# Patient Record
Sex: FEMALE | Race: WHITE | NOT HISPANIC OR LATINO
[De-identification: names, ages, dates, MRNs, and addresses within clinical notes are randomized per-mention and may not be internally consistent; named-entity substitution may affect disease eponyms.]

---

## 2017-10-26 ENCOUNTER — APPOINTMENT (OUTPATIENT)
Dept: INTERNAL MEDICINE | Facility: CLINIC | Age: 32
End: 2017-10-26
Payer: COMMERCIAL

## 2017-10-26 VITALS
RESPIRATION RATE: 17 BRPM | DIASTOLIC BLOOD PRESSURE: 71 MMHG | BODY MASS INDEX: 23.22 KG/M2 | TEMPERATURE: 98.7 F | HEART RATE: 60 BPM | SYSTOLIC BLOOD PRESSURE: 104 MMHG | WEIGHT: 129 LBS | OXYGEN SATURATION: 99 %

## 2017-10-26 DIAGNOSIS — Z11.3 ENCOUNTER FOR SCREENING FOR INFECTIONS WITH A PREDOMINANTLY SEXUAL MODE OF TRANSMISSION: ICD-10-CM

## 2017-10-26 DIAGNOSIS — Z97.3 PRESENCE OF SPECTACLES AND CONTACT LENSES: ICD-10-CM

## 2017-10-26 DIAGNOSIS — Z23 ENCOUNTER FOR IMMUNIZATION: ICD-10-CM

## 2017-10-26 DIAGNOSIS — Z12.4 ENCOUNTER FOR SCREENING FOR MALIGNANT NEOPLASM OF CERVIX: ICD-10-CM

## 2017-10-31 LAB — CYTOLOGY CVX/VAG DOC THIN PREP: NORMAL

## 2017-12-27 ENCOUNTER — APPOINTMENT (OUTPATIENT)
Dept: INTERNAL MEDICINE | Facility: CLINIC | Age: 32
End: 2017-12-27

## 2019-10-21 ENCOUNTER — APPOINTMENT (OUTPATIENT)
Dept: OBGYN | Facility: CLINIC | Age: 34
End: 2019-10-21
Payer: COMMERCIAL

## 2019-10-21 VITALS
BODY MASS INDEX: 22.43 KG/M2 | TEMPERATURE: 98.2 F | HEIGHT: 62.5 IN | SYSTOLIC BLOOD PRESSURE: 112 MMHG | WEIGHT: 125 LBS | DIASTOLIC BLOOD PRESSURE: 84 MMHG | HEART RATE: 71 BPM | OXYGEN SATURATION: 98 %

## 2019-10-21 DIAGNOSIS — Z01.419 ENCOUNTER FOR GYNECOLOGICAL EXAMINATION (GENERAL) (ROUTINE) W/OUT ABNORMAL FINDINGS: ICD-10-CM

## 2019-10-21 PROCEDURE — 99385 PREV VISIT NEW AGE 18-39: CPT

## 2019-10-22 NOTE — PHYSICAL EXAM
[Awake] : awake [Alert] : alert [Acute Distress] : no acute distress [Thyroid Nodule] : no thyroid nodule [LAD] : no lymphadenopathy [Goiter] : no goiter [Mass] : no breast mass [Nipple Discharge] : no nipple discharge [Soft] : soft [Axillary LAD] : no axillary lymphadenopathy [Tender] : non tender [Oriented x3] : oriented to person, place, and time [Normal] : uterus [No Bleeding] : there was no active vaginal bleeding [Uterine Adnexae] : were not tender and not enlarged

## 2020-04-26 ENCOUNTER — MESSAGE (OUTPATIENT)
Age: 35
End: 2020-04-26

## 2020-06-30 ENCOUNTER — TRANSCRIPTION ENCOUNTER (OUTPATIENT)
Age: 35
End: 2020-06-30

## 2020-12-21 PROBLEM — Z01.419 ENCOUNTER FOR ANNUAL ROUTINE GYNECOLOGICAL EXAMINATION: Status: RESOLVED | Noted: 2019-10-22 | Resolved: 2020-12-21

## 2021-07-01 ENCOUNTER — FORM ENCOUNTER (OUTPATIENT)
Age: 36
End: 2021-07-01

## 2021-07-01 ENCOUNTER — RESULT REVIEW (OUTPATIENT)
Age: 36
End: 2021-07-01

## 2021-07-02 ENCOUNTER — FORM ENCOUNTER (OUTPATIENT)
Age: 36
End: 2021-07-02

## 2021-07-02 ENCOUNTER — APPOINTMENT (OUTPATIENT)
Dept: OBGYN | Facility: CLINIC | Age: 36
End: 2021-07-02
Payer: COMMERCIAL

## 2021-07-02 PROCEDURE — 99395 PREV VISIT EST AGE 18-39: CPT

## 2021-07-02 PROCEDURE — 99072 ADDL SUPL MATRL&STAF TM PHE: CPT

## 2021-07-07 ENCOUNTER — FORM ENCOUNTER (OUTPATIENT)
Age: 36
End: 2021-07-07

## 2021-09-16 ENCOUNTER — APPOINTMENT (OUTPATIENT)
Dept: OBGYN | Facility: CLINIC | Age: 36
End: 2021-09-16

## 2021-09-20 ENCOUNTER — FORM ENCOUNTER (OUTPATIENT)
Age: 36
End: 2021-09-20

## 2021-09-21 ENCOUNTER — APPOINTMENT (OUTPATIENT)
Dept: OBGYN | Facility: CLINIC | Age: 36
End: 2021-09-21
Payer: COMMERCIAL

## 2021-09-21 PROCEDURE — 81025 URINE PREGNANCY TEST: CPT

## 2021-09-21 PROCEDURE — 58300 INSERT INTRAUTERINE DEVICE: CPT

## 2021-09-29 DIAGNOSIS — Z97.5 PRESENCE OF (INTRAUTERINE) CONTRACEPTIVE DEVICE: ICD-10-CM

## 2021-09-29 LAB — CYTOLOGY CVX/VAG DOC THIN PREP: NORMAL

## 2021-09-29 RX ORDER — MULTIVITAMIN
TABLET ORAL
Refills: 0 | Status: ACTIVE | COMMUNITY

## 2021-11-11 ENCOUNTER — APPOINTMENT (OUTPATIENT)
Dept: OBGYN | Facility: CLINIC | Age: 36
End: 2021-11-11
Payer: COMMERCIAL

## 2021-11-11 VITALS
WEIGHT: 125 LBS | BODY MASS INDEX: 23 KG/M2 | SYSTOLIC BLOOD PRESSURE: 103 MMHG | DIASTOLIC BLOOD PRESSURE: 70 MMHG | HEIGHT: 62 IN

## 2021-11-11 DIAGNOSIS — Z30.431 ENCOUNTER FOR ROUTINE CHECKING OF INTRAUTERINE CONTRACEPTIVE DEVICE: ICD-10-CM

## 2021-11-11 PROCEDURE — 99213 OFFICE O/P EST LOW 20 MIN: CPT

## 2021-11-11 NOTE — HISTORY OF PRESENT ILLNESS
[FreeTextEntry1] : 37 yo F here for IUD check. Pt is doing well, no complaints.\par Mirena IUD placed 9/21/21

## 2021-11-11 NOTE — DISCUSSION/SUMMARY
[FreeTextEntry1] : IUD check today, strings present. \par Reviewed Mirena\par all questions answered

## 2022-05-21 ENCOUNTER — EMERGENCY (EMERGENCY)
Facility: HOSPITAL | Age: 37
LOS: 1 days | Discharge: HOME | End: 2022-05-21
Attending: EMERGENCY MEDICINE
Payer: COMMERCIAL

## 2022-05-21 VITALS
SYSTOLIC BLOOD PRESSURE: 129 MMHG | OXYGEN SATURATION: 100 % | WEIGHT: 128.97 LBS | DIASTOLIC BLOOD PRESSURE: 95 MMHG | TEMPERATURE: 98 F | RESPIRATION RATE: 20 BRPM | HEART RATE: 86 BPM | HEIGHT: 62 IN

## 2022-05-21 VITALS
OXYGEN SATURATION: 100 % | TEMPERATURE: 98 F | SYSTOLIC BLOOD PRESSURE: 118 MMHG | HEART RATE: 68 BPM | DIASTOLIC BLOOD PRESSURE: 81 MMHG | RESPIRATION RATE: 18 BRPM

## 2022-05-21 LAB — SARS-COV-2 RNA SPEC QL NAA+PROBE: DETECTED

## 2022-05-21 PROCEDURE — U0003: CPT

## 2022-05-21 PROCEDURE — 99285 EMERGENCY DEPT VISIT HI MDM: CPT

## 2022-05-21 PROCEDURE — U0005: CPT

## 2022-05-21 PROCEDURE — 99284 EMERGENCY DEPT VISIT MOD MDM: CPT

## 2022-05-21 RX ORDER — ONDANSETRON 8 MG/1
4 TABLET, FILM COATED ORAL ONCE
Refills: 0 | Status: COMPLETED | OUTPATIENT
Start: 2022-05-21 | End: 2022-05-21

## 2022-05-21 RX ADMIN — ONDANSETRON 4 MILLIGRAM(S): 8 TABLET, FILM COATED ORAL at 12:24

## 2022-05-21 NOTE — ED PROVIDER NOTE - PHYSICAL EXAMINATION
Gen: NAD, non-toxic appearing  Head: normal appearing  HEENT: normal conjunctiva, oral mucosa moist  Lung: no respiratory distress, speaking in full sentences, CTA b/l     CV: regular rate and rhythm, no murmurs  Abd: soft, non distended, non tender   MSK: no visible deformities  Neuro: No focal deficits, AAOx3  Skin: Warm  Psych: normal affect Gen: NAD, non-toxic appearing  Head: normal appearing  HEENT: normal conjunctiva, oral mucosa moist  Lung: no respiratory distress, speaking in full sentences, CTA b/l     CV: regular rate and rhythm, no murmurs  Abd: soft, non distended, non tender   MSK: no visible deformities  Neuro: No focal deficits, AAOx3  Skin: Warm  Psych: normal affect  **ATTENDING ADDENDUM (Dr. Alcides Perry): I have reviewed and substantially contributed to the elements of the PE as documented above. I have directly performed an examination of this patient in conjunction with the other members (EM resident/PA/NP) of the patient care team. I have personally reviewed the patient's vital signs at the time of the patient's initial presentation to the ED and repeatedly throughout the ED course.

## 2022-05-21 NOTE — ED PROVIDER NOTE - PLAN OF CARE
**ATTENDING ADDENDUM (Dr. Alcides Perry): Goals of care include resolution of emergent/urgent symptoms and concerns, and restoration to baseline level of homeostasis.

## 2022-05-21 NOTE — ED PROVIDER NOTE - NSFOLLOWUPINSTRUCTIONS_ED_ALL_ED_FT
You are being testing for the COVID-19 virus. The tests performed for this and other viruses may not come back immediately. Here are some things you should know about the testing and treatment process in the meantime:    If you test positive for a different virus (for example, the flu) and the COVID test is negative, then a prolonged quarantine period is not necessary. You should stay home and avoid contact with others until you are without fever for at least 24 hours and your other symptoms are starting to improve.    If the tests for the other viruses are negative, then you will need to follow the quarantine procedures we discussed regardless of whether or not you test positive for COVID-19.    It is important for you to know that one negative test for COVID-19 is not enough to stop the quarantine procedures. If all the tests performed today are negative, you will still need to be quarantined at home.    We are recommending a 7 day quarantine. At the end of that time, you must still meet all of the following criteria:  - You have been without fever for at least 24 hours, without the use of medication to suppress fever  - Your other symptoms are improving  - If you tested positive for COVID-19, it has been at least 7 days since that test was performed    If any of the above are not true for you after the 14 day period (e.g. continued fevers) call your primary care doctor or the emergency department for additional guidance.    You should restrict the number of people in your home except for those who must absolutely be present (caregivers, children, spouse, etc). Pay special attention to avoid contact with those over the age of 65 or with significant chronic medical conditions (see below).    Illness severe enough to warrant to hospitalization can happen sometimes, even in people who were initially well enough to monitor themselves at home. You should return to the hospital immediately if you experience worsening shortness of breath, confusion, or any other new or concerning symptoms. If you are over the age of 65, or have chronic medical conditions such as chronic heart disease, asthma, COPD, kidney or liver problems, cancer, or take any drugs that suppress the immune system, you should have a low threshold for returning to the emergency department.    If you have any questions please call [phone] for more information and instruction.

## 2022-05-21 NOTE — ED PROVIDER NOTE - CARE PLAN
1 Principal Discharge DX:	URI (upper respiratory infection)   Principal Discharge DX:	URI (upper respiratory infection)  Goal:	**ATTENDING ADDENDUM (Dr. Alcides Perry): Goals of care include resolution of emergent/urgent symptoms and concerns, and restoration to baseline level of homeostasis.

## 2022-05-21 NOTE — ED PROVIDER NOTE - OBJECTIVE STATEMENT
35 yo F no pmhx p/w f/c sore throat congestion for 5 days. non productive cough nausea no vomiting. no cp sob. 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 no leg swelling. took Tylenol last yesterday with minimal relief. adequate po intake. denies HA dysuria. 35 yo F no pmhx p/w f/c sore throat congestion for 5 days. non productive cough nausea no vomiting. no cp sob. 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 no leg swelling. took Tylenol last yesterday with minimal relief. adequate po intake. denies HA dysuria.  **ATTENDING ADDENDUM (Dr. Alcides Perry): I attest that I have directly and personally interviewed and examined this patient and elicited a comparable history of present illness and review of systems as documented, along with my EM resident. I attest that I have made significant contributions to the documentation where necessary and as noted in the EMR.

## 2022-05-21 NOTE — ED PROVIDER NOTE - PROGRESS NOTE DETAILS
**ATTENDING ADDENDUM (Dr. Alcides Perry): Agree with goals/plan of ED care as described in EMR, including diagnostics, therapeutics and consultation as clinically warranted. Will continue to observe and monitor closely. Anticipatory guidance provided by ED team at time of initial presentation. **ATTENDING ADDENDUM (Dr. Alcides Perry): Labs reviewed and analyzed. Pertinent findings include:  POSITIVE COVID-19 results on nasopharyngeal testing. Agree with disposition to discharge home with close outpatient followup with primary care physician/provider and with anticipatory guidance as provided by ED team. Patient appears STABLE for discharge at this time.

## 2022-05-21 NOTE — ED PROVIDER NOTE - MUSCULOSKELETAL, MLM
Spine appears normal, range of motion is not limited, no muscle or joint tenderness. **ATTENDING ADDENDUM (Dr. Alcides Perry): Symmetrically equal strength, 5/5, in the bilateral upper and lower extremities. NO cords, soft-tissue swelling or calf tenderness in the bilateral lower extremities.

## 2022-05-21 NOTE — ED PROVIDER NOTE - GASTROINTESTINAL, MLM
Abdomen soft, non-tender **ATTENDING ADDENDUM (Dr. Alcides Perry): non-distended. NO guarding, rebound, or rigidity. NO pulsatile or non-pulsatile masses. NO hernias. NO obvious hepatosplenomegaly.

## 2022-05-21 NOTE — ED PROVIDER NOTE - ATTENDING CONTRIBUTION TO CARE
**ATTENDING ADDENDUM (Dr. Alcides Perry): I attest that I have directly examined this patient and reviewed and formulated the diagnostic and therapeutic management plan in collaboration with the EM resident. Please see MDM note and remainder of EMR for findings from CC, HPI, ROS, and PE. (Leidy)

## 2022-05-21 NOTE — ED PROVIDER NOTE - CHIEF COMPLAINT
The patient is a 36y Female complaining of fever. The patient is a 36-year-old woman POSITIVE HCV ([facility], [address] [place], Cardiac floor) presenting with concern for subjective feverishness, throat soreness, mild non-productive cough, and generalized nausea without vomiting, hypoxia, chest pain, palpitations, shortness of breath, dyspnea on exertion, abdominal pain, frequency, urgency, hesitancy, dysuria, or flank/back pain. The patient is a 36-year-old woman POSITIVE HCW ([facility], [address] [place], Cardiac floor) presenting with concern for subjective feverishness, throat soreness, mild non-productive cough, and generalized nausea without vomiting, hypoxia, chest pain, palpitations, shortness of breath, dyspnea on exertion, abdominal pain, frequency, urgency, hesitancy, dysuria, or flank/back pain.

## 2022-05-21 NOTE — ED PROVIDER NOTE - PATIENT PORTAL LINK FT
You can access the FollowMyHealth Patient Portal offered by [facility] by registering at the following website: [email or web address]. By joining [employer]’s FollowMyHealth portal, you will also be able to view your health information using other applications (apps) compatible with our system.

## 2022-05-21 NOTE — ED PROVIDER NOTE - AGGRAVATING FACTORS
**ATTENDING ADDENDUM (Dr. Alcides Perry): NO movement-associated, pleuritic, reproducible, positional, or exertional component to the symptoms./coughing

## 2022-05-21 NOTE — ED ADULT NURSE NOTE - OBJECTIVE STATEMENT
pt here with c/o fever and dizziness.  she had a fever yesterday and it was relieved with tylenol.  here today for testing for covid.

## 2022-05-21 NOTE — ED ADULT NURSE NOTE - NSFALLRSKPASTHIST_ED_ALL_ED
Problem: Perioperative Period (Adult)  Goal: Signs and Symptoms of Listed Potential Problems Will be Absent or Manageable (Perioperative Period)  Outcome: Ongoing (interventions implemented as appropriate)    03/27/17 1110   Perioperative Period   Problems Assessed (Perioperative Period) all   Problems Present (Perioperative Period) pain            no

## 2022-05-21 NOTE — ED PROVIDER NOTE - NEUROLOGICAL, MLM
Alert and oriented, no focal deficits, no motor or sensory deficits. **ATTENDING ADDENDUM (Dr. Alcides Perry): NO numbness, tingling, weakness, or paresthesias. NO focal or generalized weakness.

## 2022-05-21 NOTE — ED PROVIDER NOTE - NS ED ATTENDING STATEMENT MOD
I have seen and examined this patient and fully participated in the care of this patient as the teaching attending.  The service was shared with the BLUE.  I reviewed and verified the documentation and independently performed the documented: Attending with

## 2022-05-21 NOTE — ED PROVIDER NOTE - NS ED ROS FT
**ATTENDING ADDENDUM (Dr. Alcides Perry): During my interview with the patient, I have personally obtained and/or have directly verified the elements in the past medical/surgical history and other histories as noted earlier in the EMR, in conjunction with the other members (EM resident/PA/NP) of the patient care team. I have also personally obtained and/or have directly verified/reviewed the review of systems as documented below, in conjunction with the other members (EM resident/PA/NP) of the patient care team. **ATTENDING ADDENDUM (Dr. Alcides Perry): During my interview with the patient, I have personally obtained and/or have directly verified the elements in the past medical/surgical history and other histories as noted earlier in the EMR, in conjunction with the other members (EM resident/PA/NP) of the patient care team. I have also personally obtained and/or have directly verified/reviewed the review of systems as documented below, in conjunction with the other members (EM resident/PA/NP) of the patient care team. Of note, and in addition to the above, the patient reports a history of vaping. Patient reports IUD placement in past.

## 2022-05-21 NOTE — ED PROVIDER NOTE - CHPI ED SYMPTOMS POS
**ATTENDING ADDENDUM (Dr. Alcides Perry): POSITIVE sore throat, POSITIVE nausea/CHEST CONGESTION/COUGH/FEVER

## 2022-05-21 NOTE — ED PROVIDER NOTE - CHPI ED SYMPTOMS NEG
**ATTENDING ADDENDUM (Dr. Alcides Perry): NO dysuria. NO productivity./no chest pain/no headache/no hemoptysis/no shortness of breath/no wheezing

## 2022-05-21 NOTE — ED PROVIDER NOTE - EYES, MLM
Clear bilaterally, pupils equal, round and reactive to light. **ATTENDING ADDENDUM (Dr. Alcides Perry): Extraocular muscle movements intact. Clear corneas bilaterally, pupils equal and round. NO photophobia.

## 2022-05-21 NOTE — ED PROVIDER NOTE - CLINICAL SUMMARY MEDICAL DECISION MAKING FREE TEXT BOX
35 yo F no pmhx p/w 5 days of f/c nausea sore throat congestion. no measured fevers. no cp sob. afebrile on arrival 100% RA. benign exam. likely covid infection vs uri low c/f pna uti. will give zofran covid swab and d.c 37 yo F no pmhx p/w 5 days of f/c nausea sore throat congestion. no measured fevers. no cp sob. afebrile on arrival 100% RA. benign exam. likely covid infection vs uri low c/f pna uti. will give zofran covid swab and d.c  **ATTENDING MEDICAL DECISION MAKING/SYNTHESIS (Dr. Alcides Perry): I have reviewed the Chief Concern(s), the HPI, the ROS, and have directly performed and confirmed the findings on the Physical Examination. I have reviewed the medical decision making with all providers, as applicable. The PROBLEM REPRESENTATION at this time is:  36-year-old woman POSITIVE HCV ([facility], [address], Cardiac floor) presenting with concern for subjective feverishness, throat soreness, mild non-productive cough, and generalized nausea without vomiting, hypoxia, chest pain, palpitations, shortness of breath, dyspnea on exertion, abdominal pain, frequency, urgency, hesitancy, dysuria, or flank/back pain. The MOST LIKELY DIAGNOSIS, and the LIST OF DIFFERENTIAL DIAGNOSES, includes (but is not limited to) the following: acute COVID infection (SARS-CoV-2), other viral upper/lower respiratory tract infection e.g. RSV, hMPV, influenza A/B, or equivalent (may exist as co-infection), ARDS, serious bacterial upper/lower respiratory tract infection e.g. CAP (typical or atypical pneumonia), tuberculosis (unlikely), empyema, or equivalent, other febrile illness causing or caused by infection e.g. UTI, other  infection, intraabdominal infection, or equivalent, pleurisy, pulmonary embolism/DVT, shock, dehydration, electrolyte-metabolic-endocrine derangements. The likelihood of each of these diagnoses has been appropriately considered in the context of this patient's presentation and my evaluation. PLAN: as described in EMR, including diagnostics, therapeutics and consultation as clinically warranted. I will continue to reevaluate the patient, including the results of all testing, and monitor response to therapy throughout the patient's course in the ED. 35 yo F no pmhx p/w 5 days of f/c nausea sore throat congestion. no measured fevers. no cp sob. afebrile on arrival 100% RA. benign exam. likely covid infection vs uri low c/f pna uti. will give zofran covid swab and d.c  **ATTENDING MEDICAL DECISION MAKING/SYNTHESIS (Dr. Alcides Perry): I have reviewed the Chief Concern(s), the HPI, the ROS, and have directly performed and confirmed the findings on the Physical Examination. I have reviewed the medical decision making with all providers, as applicable. The PROBLEM REPRESENTATION at this time is:  36-year-old woman POSITIVE HCW ([facility], [address], Cardiac floor) presenting with concern for subjective feverishness, throat soreness, mild non-productive cough, and generalized nausea without vomiting, hypoxia, chest pain, palpitations, shortness of breath, dyspnea on exertion, abdominal pain, frequency, urgency, hesitancy, dysuria, or flank/back pain. The MOST LIKELY DIAGNOSIS, and the LIST OF DIFFERENTIAL DIAGNOSES, includes (but is not limited to) the following: acute COVID infection (SARS-CoV-2), other viral upper/lower respiratory tract infection e.g. RSV, hMPV, influenza A/B, or equivalent (may exist as co-infection), ARDS, serious bacterial upper/lower respiratory tract infection e.g. CAP (typical or atypical pneumonia), tuberculosis (unlikely), empyema, or equivalent, other febrile illness causing or caused by infection e.g. UTI, other  infection, intraabdominal infection, or equivalent, pleurisy, pulmonary embolism/DVT, shock, dehydration, electrolyte-metabolic-endocrine derangements. The likelihood of each of these diagnoses has been appropriately considered in the context of this patient's presentation and my evaluation. PLAN: as described in EMR, including diagnostics, therapeutics and consultation as clinically warranted. I will continue to reevaluate the patient, including the results of all testing, and monitor response to therapy throughout the patient's course in the ED.

## 2022-05-21 NOTE — ED PROVIDER NOTE - RESPIRATORY, MLM
Breath sounds clear and equal bilaterally. **ATTENDING ADDENDUM (Dr. Alcides Perry): NO wheezing, rales, rhonchi, crackles, stridor, drooling, retractions, nasal flaring, or tripoding.

## 2022-05-21 NOTE — ED PROVIDER NOTE - SKIN, MLM
Skin normal color for race, warm, dry and intact. No evidence of rash. **ATTENDING ADDENDUM (Dr. Alcides Perry): NO rashes, lesions, ulcers, vesicles, erythema, streaking, lymphangitic spread, crepitus, cellulitis, petechiae, purpurae, track marks or ecchymoses.

## 2023-01-21 ENCOUNTER — EMERGENCY (EMERGENCY)
Facility: HOSPITAL | Age: 38
LOS: 1 days | Discharge: HOME | End: 2023-01-21
Attending: STUDENT IN AN ORGANIZED HEALTH CARE EDUCATION/TRAINING PROGRAM
Payer: COMMERCIAL

## 2023-01-21 VITALS
OXYGEN SATURATION: 98 % | WEIGHT: 130.07 LBS | RESPIRATION RATE: 18 BRPM | HEIGHT: 62 IN | HEART RATE: 86 BPM | DIASTOLIC BLOOD PRESSURE: 89 MMHG | SYSTOLIC BLOOD PRESSURE: 127 MMHG | TEMPERATURE: 98 F

## 2023-01-21 VITALS
RESPIRATION RATE: 18 BRPM | HEART RATE: 80 BPM | OXYGEN SATURATION: 100 % | TEMPERATURE: 98 F | DIASTOLIC BLOOD PRESSURE: 94 MMHG | SYSTOLIC BLOOD PRESSURE: 126 MMHG

## 2023-01-21 PROBLEM — Z78.9 OTHER SPECIFIED HEALTH STATUS: Chronic | Status: ACTIVE | Noted: 2022-05-21

## 2023-01-21 LAB
APPEARANCE UR: CLEAR — SIGNIFICANT CHANGE UP
BACTERIA # UR AUTO: NEGATIVE — SIGNIFICANT CHANGE UP
BILIRUB UR-MCNC: NEGATIVE — SIGNIFICANT CHANGE UP
COLOR SPEC: COLORLESS — SIGNIFICANT CHANGE UP
DIFF PNL FLD: ABNORMAL
EPI CELLS # UR: 1 /HPF — SIGNIFICANT CHANGE UP
GLUCOSE UR QL: NEGATIVE — SIGNIFICANT CHANGE UP
HYALINE CASTS # UR AUTO: 1 /LPF — SIGNIFICANT CHANGE UP (ref 0–2)
KETONES UR-MCNC: NEGATIVE — SIGNIFICANT CHANGE UP
LEUKOCYTE ESTERASE UR-ACNC: NEGATIVE — SIGNIFICANT CHANGE UP
NITRITE UR-MCNC: NEGATIVE — SIGNIFICANT CHANGE UP
PH UR: 6.5 — SIGNIFICANT CHANGE UP (ref 5–8)
PROT UR-MCNC: NEGATIVE — SIGNIFICANT CHANGE UP
RBC CASTS # UR COMP ASSIST: 0 /HPF — SIGNIFICANT CHANGE UP (ref 0–4)
SP GR SPEC: 1.01 — LOW (ref 1.01–1.02)
UROBILINOGEN FLD QL: NEGATIVE — SIGNIFICANT CHANGE UP
WBC UR QL: 1 /HPF — SIGNIFICANT CHANGE UP (ref 0–5)

## 2023-01-21 PROCEDURE — 81001 URINALYSIS AUTO W/SCOPE: CPT

## 2023-01-21 PROCEDURE — 99284 EMERGENCY DEPT VISIT MOD MDM: CPT

## 2023-01-21 PROCEDURE — 99283 EMERGENCY DEPT VISIT LOW MDM: CPT

## 2023-01-21 RX ORDER — LIDOCAINE 4 G/100G
1 CREAM TOPICAL ONCE
Refills: 0 | Status: COMPLETED | OUTPATIENT
Start: 2023-01-21 | End: 2023-01-21

## 2023-01-21 RX ORDER — IBUPROFEN 200 MG
400 TABLET ORAL ONCE
Refills: 0 | Status: COMPLETED | OUTPATIENT
Start: 2023-01-21 | End: 2023-01-21

## 2023-01-21 RX ADMIN — LIDOCAINE 1 PATCH: 4 CREAM TOPICAL at 08:00

## 2023-01-21 NOTE — ED PROVIDER NOTE - PATIENT PORTAL LINK FT
You can access the FollowMyHealth Patient Portal offered by [facility] by registering at the following website: [email or web address]. By joining [employer]’s FollowMyHealth portal, you will also be able to view your health information using other applications (apps) compatible with our system.

## 2023-01-21 NOTE — ED PROVIDER NOTE - OBJECTIVE STATEMENT
37-year-old female with no past medical history coming in complaining of right-sided back pain that began overnight.  She states at onset the pain was a 10 out of 10.  Now the pain is more like a 6 out of 10 she recently took Tylenol for the pain which she said helped.  Patient states the pain is worse when she is walking around and improves at rest.  No chest pain, dyspnea, dysuria, increased urinary frequency, urinary hesitancy, constipation, fevers or chills. 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  No skin changes or rashes present. No hx of abd surgeries. No current abd pain, N/V. No LE weakness or paresthesias.

## 2023-01-21 NOTE — ED ADULT NURSE NOTE - OBJECTIVE STATEMENT
37 y.o F BIB self p/w c/o R sided back pain since last night. A+Ox3. Pt. states sudden onset with intermittent generalized pain that worsens w/ positioning. Pt. states pain was initially 10/0 on pain scale. Pt. took Tylenol about an hr ago w/ new pain rating of 6/10. Pt. denies fever/ chills, hematuria, N/V/D. Abd. soft, non-tender, and non-distended. Pt. denies burning when urinating, and trouble starting stream. Pt. denies change in bowel movement. LMP "start of January". L/S clear B/L.

## 2023-01-21 NOTE — ED PROVIDER NOTE - CLINICAL SUMMARY MEDICAL DECISION MAKING FREE TEXT BOX
37-year-old female with no past medical history coming in complaining of right-sided back pain that began overnight. Differential diagnosis includes but is not limited to MSK back pain, thoracic radiculopathy, costochondritis, UTI. Pt well appearing, afebrile with normal BP. No fever, history of cancer, unexplained weight loss, incontinence, saddle anesthesia, weakness, numbness, recent surgery, trauma, urinary or bowel dysfunction, or focal neurologic deficits present. Pain improved with tylenol. Will tx symptomatically with lidocaine patch and ibuprofen. Considered pyelonephritis, but highly unlikely given absence of recent urinary sx, afebrile, normal BP, no CVA TTP and non-toxic appearing. PT tolerating PO. If pain improves would likely dc to rest with ATC OTC analgesia.

## 2023-01-21 NOTE — ED PROVIDER NOTE - ATTENDING CONTRIBUTION TO CARE
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 no hematuria, no urinary freq/urgency. On exam, pt w/ mild tenderness in the back has clear lungs, no cva tenderness, she has soft abdomen, nondistended, no lower leg edema. Plan for ua, possible nephrolithiasis, though in a healthy female, pt to be counselled to stay hydrated. 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 no hematuria, no urinary freq/urgency. On exam, pt w/ mild tenderness in the back has clear lungs, no cva tenderness, she has soft abdomen, nondistended, no lower leg edema. Plan for ua, possible nephrolithiasis, though in a healthy female, pt to be counselled to stay hydrated. though most likely related to msk etiology

## 2023-01-21 NOTE — ED PROVIDER NOTE - PROGRESS NOTE DETAILS
Emy Gipson, PGY-2, EM: PT reassessed. Appears comfortable at rest. States her pain is under control. Only pain with twisting. discussed return precautions including worsening pain, fever, chills, urinary sx, nausea, vomiting. ap- discussion w/ pt regarding results, possible kidney stone vs biliary colic  though likely sx are msk, as pain is worse w/ movement, pt requeisting work note, discussion r/b of ct scan and labs, pt states the thinks her sx are MSK and unlikely related to other pathology discussion regarding return precautions that are not limited to worsening pain, fever, chills, dysuria/freq/n/v, pt verbalized understanding

## 2023-01-21 NOTE — ED PROVIDER NOTE - PHYSICAL EXAMINATION
General: WN/WD NAD  Head: Atraumatic, normocephalic  Eyes: EOM grossly in tact, no scleral icterus, no discharge  ENT: moist mucous membranes  Neurology: A&Ox 3, LE strength 5/5 B/L, 2+ patellar reflexes, sensation in LE intact B/L  Respiratory: CTAB, no wheezing, normal respiratory effort  CV: RRR, good s1/s2, no S3, Extremities warm and well perfused  Abdominal: Soft, non-distended, non-tender, no masses  Extremities: No edema, no deformities  Skin: warm and dry. No rashes or lesions  Back: No CVA Tenderness, No TTP of R back

## 2023-01-21 NOTE — ED PROVIDER NOTE - NSFOLLOWUPINSTRUCTIONS_ED_ALL_ED_FT
Please follow up with your primary care physician within 2-3 days.   Return to the ER for any new or concerning symptoms.   You may take 650 mg acetaminophen every eight hours as needed for pain.   Drink plenty of fluids and rest.    Back pain has many possible causes. It is often related to problems with muscles and ligaments of the back. It may also be related to problems with the nerves, discs, or bones of the back. Moving, lifting, standing, sitting, or sleeping in an awkward way can strain the back. Sometimes you don't notice the injury until later. Arthritis is another common cause of back pain.    Although it may hurt a lot, back pain usually improves on its own within several weeks. Most people recover in 12 weeks or less. Using good home treatment and being careful not to stress your back can help you feel better sooner.    Follow-up care is a key part of your treatment and safety. Be sure to make and go to all appointments, and call your doctor if you are having problems. It's also a good idea to know your test results and keep a list of the medicines you take.    How can you care for yourself at home?    Sit or lie in positions that are most comfortable and reduce your pain. Try one of these positions when you lie down:  Lie on your back with your knees bent and supported by large pillows.  Lie on the floor with your legs on the seat of a sofa or chair.  Lie on your side with your knees and hips bent and a pillow between your legs.  Lie on your stomach if it does not make pain worse.  Do not sit up in bed, and avoid soft couches and twisted positions. Bedrest can help relieve pain at first, but it delays healing. Avoid bedrest after the first day of back pain.  Change positions every 30 minutes. If you must sit for long periods of time, take breaks from sitting. Get up and walk around, or lie in a comfortable position.  Try using a heating pad on a low or medium setting for 15 to 20 minutes every 2 or 3 hours. Try a warm shower in place of one session with the heating pad.  You can also try an ice pack for 10 to 15 minutes every 2 to 3 hours. Put a thin cloth between the ice pack and your skin.  Take pain medicines exactly as directed.  If the doctor gave you a prescription medicine for pain, take it as prescribed.  If you are not taking a prescription pain medicine, ask your doctor if you can take an over-the-counter medicine.  Take short walks several times a day. You can start with 5 to 10 minutes, 3 or 4 times a day, and work up to longer walks. Walk on level surfaces and avoid hills and stairs until your back is better.  Return to work and other activities as soon as you can. Continued rest without activity is usually not good for your back.  To prevent future back pain, do exercises to stretch and strengthen your back and stomach. Learn how to use good posture, safe lifting techniques, and proper body mechanics.  When should you call for help?  	  Call your doctor or seek immediate medical care if:    You have new or worsening numbness in your legs.  You have new or worsening weakness in your legs. (This could make it hard to stand up.)  You lose control of your bladder or bowels.  Watch closely for changes in your health, and be sure to contact your doctor or nurse advice line if:    You have a fever, lose weight, or don't feel well.  You do not get better as expected.

## 2023-05-11 ENCOUNTER — NON-APPOINTMENT (OUTPATIENT)
Age: 38
End: 2023-05-11

## 2023-05-21 ENCOUNTER — NON-APPOINTMENT (OUTPATIENT)
Age: 38
End: 2023-05-21

## 2023-06-02 ENCOUNTER — EMERGENCY (EMERGENCY)
Facility: HOSPITAL | Age: 38
LOS: 1 days | Discharge: HOME | End: 2023-06-02
Attending: EMERGENCY MEDICINE | Admitting: EMERGENCY MEDICINE
Payer: COMMERCIAL

## 2023-06-02 VITALS
OXYGEN SATURATION: 100 % | TEMPERATURE: 98 F | SYSTOLIC BLOOD PRESSURE: 115 MMHG | RESPIRATION RATE: 16 BRPM | DIASTOLIC BLOOD PRESSURE: 78 MMHG | HEART RATE: 68 BPM

## 2023-06-02 VITALS
TEMPERATURE: 98 F | HEART RATE: 70 BPM | OXYGEN SATURATION: 100 % | DIASTOLIC BLOOD PRESSURE: 73 MMHG | RESPIRATION RATE: 17 BRPM | SYSTOLIC BLOOD PRESSURE: 100 MMHG

## 2023-06-02 LAB
ALBUMIN SERPL ELPH-MCNC: 4.5 G/DL — SIGNIFICANT CHANGE UP (ref 3.3–5)
ALP SERPL-CCNC: 52 U/L — SIGNIFICANT CHANGE UP (ref 40–120)
ALT FLD-CCNC: 16 U/L — SIGNIFICANT CHANGE UP (ref 4–33)
ANION GAP SERPL CALC-SCNC: 12 MMOL/L — SIGNIFICANT CHANGE UP (ref 7–14)
APPEARANCE UR: CLEAR — SIGNIFICANT CHANGE UP
AST SERPL-CCNC: 16 U/L — SIGNIFICANT CHANGE UP (ref 4–32)
BACTERIA # UR AUTO: NEGATIVE — SIGNIFICANT CHANGE UP
BASOPHILS # BLD AUTO: 0.05 K/UL — SIGNIFICANT CHANGE UP (ref 0–0.2)
BASOPHILS NFR BLD AUTO: 0.5 % — SIGNIFICANT CHANGE UP (ref 0–2)
BILIRUB SERPL-MCNC: 0.7 MG/DL — SIGNIFICANT CHANGE UP (ref 0.2–1.2)
BILIRUB UR-MCNC: NEGATIVE — SIGNIFICANT CHANGE UP
BUN SERPL-MCNC: 8 MG/DL — SIGNIFICANT CHANGE UP (ref 7–23)
CALCIUM SERPL-MCNC: 9.4 MG/DL — SIGNIFICANT CHANGE UP (ref 8.4–10.5)
CHLORIDE SERPL-SCNC: 102 MMOL/L — SIGNIFICANT CHANGE UP (ref 98–107)
CO2 SERPL-SCNC: 24 MMOL/L — SIGNIFICANT CHANGE UP (ref 22–31)
COLOR SPEC: COLORLESS — SIGNIFICANT CHANGE UP
CREAT SERPL-MCNC: 0.6 MG/DL — SIGNIFICANT CHANGE UP (ref 0.5–1.3)
DIFF PNL FLD: ABNORMAL
EGFR: 118 ML/MIN/1.73M2 — SIGNIFICANT CHANGE UP
EOSINOPHIL # BLD AUTO: 0.26 K/UL — SIGNIFICANT CHANGE UP (ref 0–0.5)
EOSINOPHIL NFR BLD AUTO: 2.5 % — SIGNIFICANT CHANGE UP (ref 0–6)
EPI CELLS # UR: 5 /HPF — SIGNIFICANT CHANGE UP (ref 0–5)
GLUCOSE SERPL-MCNC: 103 MG/DL — HIGH (ref 70–99)
GLUCOSE UR QL: NEGATIVE — SIGNIFICANT CHANGE UP
HCG SERPL-ACNC: <1 MIU/ML — SIGNIFICANT CHANGE UP
HCT VFR BLD CALC: 39.7 % — SIGNIFICANT CHANGE UP (ref 34.5–45)
HGB BLD-MCNC: 13.2 G/DL — SIGNIFICANT CHANGE UP (ref 11.5–15.5)
HYALINE CASTS # UR AUTO: 3 /LPF — SIGNIFICANT CHANGE UP (ref 0–7)
IANC: 7.84 K/UL — HIGH (ref 1.8–7.4)
IMM GRANULOCYTES NFR BLD AUTO: 0.5 % — SIGNIFICANT CHANGE UP (ref 0–0.9)
KETONES UR-MCNC: NEGATIVE — SIGNIFICANT CHANGE UP
LEUKOCYTE ESTERASE UR-ACNC: ABNORMAL
LYMPHOCYTES # BLD AUTO: 1.73 K/UL — SIGNIFICANT CHANGE UP (ref 1–3.3)
LYMPHOCYTES # BLD AUTO: 16.4 % — SIGNIFICANT CHANGE UP (ref 13–44)
MCHC RBC-ENTMCNC: 30.6 PG — SIGNIFICANT CHANGE UP (ref 27–34)
MCHC RBC-ENTMCNC: 33.2 GM/DL — SIGNIFICANT CHANGE UP (ref 32–36)
MCV RBC AUTO: 91.9 FL — SIGNIFICANT CHANGE UP (ref 80–100)
MONOCYTES # BLD AUTO: 0.61 K/UL — SIGNIFICANT CHANGE UP (ref 0–0.9)
MONOCYTES NFR BLD AUTO: 5.8 % — SIGNIFICANT CHANGE UP (ref 2–14)
NEUTROPHILS # BLD AUTO: 7.84 K/UL — HIGH (ref 1.8–7.4)
NEUTROPHILS NFR BLD AUTO: 74.3 % — SIGNIFICANT CHANGE UP (ref 43–77)
NITRITE UR-MCNC: NEGATIVE — SIGNIFICANT CHANGE UP
NRBC # BLD: 0 /100 WBCS — SIGNIFICANT CHANGE UP (ref 0–0)
NRBC # FLD: 0 K/UL — SIGNIFICANT CHANGE UP (ref 0–0)
PH UR: 6.5 — SIGNIFICANT CHANGE UP (ref 5–8)
PLATELET # BLD AUTO: 283 K/UL — SIGNIFICANT CHANGE UP (ref 150–400)
POTASSIUM SERPL-MCNC: 4 MMOL/L — SIGNIFICANT CHANGE UP (ref 3.5–5.3)
POTASSIUM SERPL-SCNC: 4 MMOL/L — SIGNIFICANT CHANGE UP (ref 3.5–5.3)
PROT SERPL-MCNC: 7 G/DL — SIGNIFICANT CHANGE UP (ref 6–8.3)
PROT UR-MCNC: NEGATIVE — SIGNIFICANT CHANGE UP
RBC # BLD: 4.32 M/UL — SIGNIFICANT CHANGE UP (ref 3.8–5.2)
RBC # FLD: 11.3 % — SIGNIFICANT CHANGE UP (ref 10.3–14.5)
RBC CASTS # UR COMP ASSIST: 1 /HPF — SIGNIFICANT CHANGE UP (ref 0–4)
SODIUM SERPL-SCNC: 138 MMOL/L — SIGNIFICANT CHANGE UP (ref 135–145)
SP GR SPEC: 1 — LOW (ref 1.01–1.05)
UROBILINOGEN FLD QL: SIGNIFICANT CHANGE UP
WBC # BLD: 10.54 K/UL — HIGH (ref 3.8–10.5)
WBC # FLD AUTO: 10.54 K/UL — HIGH (ref 3.8–10.5)
WBC UR QL: 37 /HPF — HIGH (ref 0–5)

## 2023-06-02 PROCEDURE — 99284 EMERGENCY DEPT VISIT MOD MDM: CPT

## 2023-06-02 PROCEDURE — 74176 CT ABD & PELVIS W/O CONTRAST: CPT | Mod: 26,MA

## 2023-06-02 RX ORDER — SODIUM CHLORIDE 9 MG/ML
1000 INJECTION INTRAMUSCULAR; INTRAVENOUS; SUBCUTANEOUS ONCE
Refills: 0 | Status: COMPLETED | OUTPATIENT
Start: 2023-06-02 | End: 2023-06-02

## 2023-06-02 RX ORDER — CEFTRIAXONE 500 MG/1
1000 INJECTION, POWDER, FOR SOLUTION INTRAMUSCULAR; INTRAVENOUS ONCE
Refills: 0 | Status: COMPLETED | OUTPATIENT
Start: 2023-06-02 | End: 2023-06-02

## 2023-06-02 RX ORDER — KETOROLAC TROMETHAMINE 30 MG/ML
15 SYRINGE (ML) INJECTION ONCE
Refills: 0 | Status: DISCONTINUED | OUTPATIENT
Start: 2023-06-02 | End: 2023-06-02

## 2023-06-02 RX ORDER — CEFPODOXIME PROXETIL 100 MG
1 TABLET ORAL
Qty: 14 | Refills: 0
Start: 2023-06-02 | End: 2023-06-08

## 2023-06-02 RX ADMIN — CEFTRIAXONE 100 MILLIGRAM(S): 500 INJECTION, POWDER, FOR SOLUTION INTRAMUSCULAR; INTRAVENOUS at 14:51

## 2023-06-02 RX ADMIN — SODIUM CHLORIDE 1000 MILLILITER(S): 9 INJECTION INTRAMUSCULAR; INTRAVENOUS; SUBCUTANEOUS at 12:37

## 2023-06-02 RX ADMIN — Medication 15 MILLIGRAM(S): at 13:06

## 2023-06-02 RX ADMIN — Medication 15 MILLIGRAM(S): at 12:36

## 2023-06-02 RX ADMIN — SODIUM CHLORIDE 1000 MILLILITER(S): 9 INJECTION INTRAMUSCULAR; INTRAVENOUS; SUBCUTANEOUS at 13:37

## 2023-06-02 RX ADMIN — CEFTRIAXONE 1000 MILLIGRAM(S): 500 INJECTION, POWDER, FOR SOLUTION INTRAMUSCULAR; INTRAVENOUS at 15:21

## 2023-06-02 NOTE — ED PROVIDER NOTE - NSFOLLOWUPINSTRUCTIONS_ED_ALL_ED_FT
You were seen in the Emergency Department for Abdominal pain. Lab and imaging results, if performed, were discussed with you along with your discharge diagnosis.    Follow up with your doctor in 1 week - bring copies of your results if you were given. If you do not have a primary doctor, please call [phone] to find one convenient for you.    A prescription for  cefpodoxime 200 mg every 12 hours was sent to your pharmacy. Take as prescribed, Continue all prescribed medications.     To control your pain at home, you should take Ibuprofen 400 mg along with Tylenol 650mg-1000mg every 6 to 8 hours. Limit your maximum daily Tylenol from all sources to 4000mg. Be aware that many other medications contain acetaminophen which is also known as Tylenol. Taking Tylenol and Ibuprofen together has been shown to be more effective at relieving pain than taking them separately. These are both over the counter medications that you can  at your local pharmacy without a prescription. You need to respect all of the warnings on the bottles. You shouldn’t take these medications for more than a week without following up with your doctor. Both medications come with certain risks and side effects that you need to discuss with your doctor, especially if you are taking them for a prolonged period.    Return to ED for any new or worsening symptoms including but not limited to: development of chest pain, shortness of breath, fever, vomiting, focal numbness, weakness or tingling, any severe CP, headache, abdominal pain, back pain.      Rest and keep yourself hydrated with fluids.

## 2023-06-02 NOTE — ED PROVIDER NOTE - PATIENT PORTAL LINK FT
You can access the FollowMyHealth Patient Portal offered by [facility] by registering at the following website: [email or web address]. By joining [employer]’s FollowMyHealth portal, you will also be able to view your health information using other applications (apps) compatible with our system.

## 2023-06-02 NOTE — ED ADULT NURSE NOTE - OBJECTIVE STATEMENT
Pt A+Ox4. Pt c/o lower back pain that has now radiated to right side mostly.  Pt also c/o burning pain during urination.  Lungs clear, abdomen soft, skin intact.  IV placed in left AC#20.  IVF infusing.

## 2023-06-02 NOTE — ED PROVIDER NOTE - PHYSICAL EXAMINATION
General: Alert and Orientated x 3. No apparent distress.  Head: Normocephalic and atraumatic.  Eyes: PERRLA with EOMI.  Neck: Supple. Trachea midline.   Cardiac: Normal S1 and S2 w/ RRR. No murmurs appreciated. No JVD appreciated.  Pulmonary: CTA bilaterally. No increased WOB. No wheezes or crackles.  Abdominal: Soft, non-tender.  left  CVA tenderness to palpation.  (+) bowel sounds appreciated in all 4 quadrants. No hepatosplenomegaly.   Neurologic: No focal sensory or motor deficits.  Musculoskeletal: Strength appropriate in all 4 extremities for age with no limited ROM.  Skin: Color appropriate for race. Intact, warm, and well-perfused.  Psychiatric: Appropriate mood and affect. No apparent risk to self or others.

## 2023-06-02 NOTE — ED ADULT NURSE NOTE - NSFALLUNIVINTERV_ED_ALL_ED
Bed/Stretcher in lowest position, wheels locked, appropriate side rails in place/Call bell, personal items and telephone in reach/Instruct patient to call for assistance before getting out of bed/chair/stretcher/Non-slip footwear applied when patient is off stretcher/[place] to call system/Physically safe environment - no spills, clutter or unnecessary equipment/Purposeful proactive rounding/Room/bathroom lighting operational, light cord in reach

## 2023-06-02 NOTE — ED PROVIDER NOTE - ATTENDING CONTRIBUTION TO CARE
Attending note:   After face to face evaluation of this patient, I concur with above noted hx, pe, and care plan for this patient.  Patel: 37 year old female with history of gastritis and recent UTI. Patient was treated with macrobid for 5 days a few weeks ago. Her. Symptoms had initially improved. Yesterday patient developed right sided back pain that was radiating to the front and dysuria returned. Patients also started having increased frequency but no hematuria was noted. Patient has no history of kidney stones but pain is intermittent. On exam patient's vitals are stable and lungs are clear and hard as regular and rhythm. Patient's abdomen shows minimal tenderness in these super pubic. Area but left CVA tenderness is noted. Rest of exams was unremarkable as noted above. Given college nature [place] will get CT along with blood work and you're in culture. Patient may have a partially treated UTI that is progressed to a pyelonephritis but cannot rule out renal colic with possibility of infected stone. We'll also give pain medication, fluids and reassess.

## 2023-06-02 NOTE — ED PROVIDER NOTE - OBJECTIVE STATEMENT
patient 37-year-old female with  Of history of gastritis presenting to the emergency department with flank pain.  Patient states yesterday she had right flank pain nonradiating.  It was intermittent.  But subsided with Tylenol.  Today she woke up with left flank pain that is also alcoholic.,  No hematuria.  Was treated for UTI with Macrobid 5 days 2 weeks ago.  Patient states that initially her symptoms improved but for the last few days she has been having dysuria.  No fever, chills, nausea, vomiting, bowel changes.  No history of renal stones.  Last menstrual period 1 month ago.  No vaginal bleeding or discharge.

## 2023-06-02 NOTE — ED PROVIDER NOTE - IV ALTEPLASE EXCL ABS HIDDEN
show
Regular rate and rhythm, Heart sounds S1 S2 present, no murmurs, rubs or gallops
Aliza Burr RD, CDN, CDE

## 2023-06-02 NOTE — ED ADULT TRIAGE NOTE - CHIEF COMPLAINT QUOTE
Pt c/o R lower back pain, burning with urination x 2 days. Pt reports pain has moved to R lower back today. Pt denies chills/fevers, blood in the urine. Hx UTI

## 2023-06-02 NOTE — ED PROVIDER NOTE - CLINICAL SUMMARY MEDICAL DECISION MAKING FREE TEXT BOX
37-year-old female with history of gastritis presenting to the ED with bilateral flank pain right flank pain yesterday, nonradiating.  Woke up today with left flank pain.  Has had dysuria for the last 2 days, treated for UTI with Macrobid 2 weeks ago.  No fever, chills, nausea, vomiting, vaginal bleeding or discharge.  No history of renal stones.  Vital stable.  Exam with left CVA tenderness, no abdominal tenderness palpation, no right CVA tenderness we will get CT abdomen pelvis without contrast to evaluate for renal stones given colicky nature of left flank pain.  We will also get labs including hCG, CBC, CMP.  We will get urinalysis.

## 2023-06-02 NOTE — ED PROVIDER NOTE - NS ED ROS FT
CONSTITUTIONAL: No fevers, no chills, no lightheadedness, no dizziness  EYES: no visual changes, no eye pain  EARS: no ear drainage, no ear pain, no change in hearing  NOSE: no nasal congestion  MOUTH/THROAT: no sore throat  CV: No chest pain, no palpitations  RESP: No SOB, no cough  GI:  see HPI  :   See HPI  MSK: no back pain, no extremity pain  SKIN: no rashes  NEURO: no headache, no focal weakness, no decreased sensation/parasthesias   PSYCHIATRIC: no known mental health issues

## 2023-10-06 ENCOUNTER — NON-APPOINTMENT (OUTPATIENT)
Age: 38
End: 2023-10-06

## 2023-10-10 ENCOUNTER — TRANSCRIPTION ENCOUNTER (OUTPATIENT)
Age: 38
End: 2023-10-10

## 2023-10-12 ENCOUNTER — APPOINTMENT (OUTPATIENT)
Dept: PODIATRY | Facility: CLINIC | Age: 38
End: 2023-10-12

## 2023-10-27 ENCOUNTER — APPOINTMENT (OUTPATIENT)
Dept: PODIATRY | Facility: CLINIC | Age: 38
End: 2023-10-27
Payer: COMMERCIAL

## 2023-10-27 DIAGNOSIS — M79.672 PAIN IN LEFT FOOT: ICD-10-CM

## 2023-10-27 DIAGNOSIS — S90.852A SUPERFICIAL FOREIGN BODY, LEFT FOOT, INITIAL ENCOUNTER: ICD-10-CM

## 2023-10-27 PROCEDURE — 10120 INC&RMVL FB SUBQ TISS SMPL: CPT | Mod: LT

## 2023-10-27 PROCEDURE — 99203 OFFICE O/P NEW LOW 30 MIN: CPT | Mod: 25

## 2023-10-27 PROCEDURE — 73630 X-RAY EXAM OF FOOT: CPT | Mod: LT

## 2023-10-27 RX ORDER — CEPHALEXIN 500 MG/1
500 CAPSULE ORAL
Qty: 15 | Refills: 0 | Status: ACTIVE | COMMUNITY
Start: 2023-10-27 | End: 1900-01-01

## 2023-10-30 PROBLEM — S90.852A SUPERFICIAL FOREIGN BODY OF LEFT FOOT, INITIAL ENCOUNTER: Status: ACTIVE | Noted: 2023-10-27

## 2023-11-03 ENCOUNTER — APPOINTMENT (OUTPATIENT)
Dept: PODIATRY | Facility: CLINIC | Age: 38
End: 2023-11-03

## 2023-11-03 PROBLEM — M79.672 LEFT FOOT PAIN: Status: ACTIVE | Noted: 2023-10-30

## 2025-04-18 ENCOUNTER — NON-APPOINTMENT (OUTPATIENT)
Age: 40
End: 2025-04-18